# Patient Record
Sex: FEMALE
[De-identification: names, ages, dates, MRNs, and addresses within clinical notes are randomized per-mention and may not be internally consistent; named-entity substitution may affect disease eponyms.]

---

## 2020-07-09 ENCOUNTER — HOSPITAL ENCOUNTER (EMERGENCY)
Dept: HOSPITAL 88 - ER | Age: 50
LOS: 1 days | Discharge: HOME | End: 2020-07-10
Payer: SELF-PAY

## 2020-07-09 VITALS — WEIGHT: 293 LBS | HEIGHT: 62 IN | BODY MASS INDEX: 53.92 KG/M2

## 2020-07-09 DIAGNOSIS — J12.9: ICD-10-CM

## 2020-07-09 DIAGNOSIS — R05: ICD-10-CM

## 2020-07-09 DIAGNOSIS — R06.02: ICD-10-CM

## 2020-07-09 DIAGNOSIS — U07.1: Primary | ICD-10-CM

## 2020-07-09 DIAGNOSIS — R50.9: ICD-10-CM

## 2020-07-09 PROCEDURE — 99283 EMERGENCY DEPT VISIT LOW MDM: CPT

## 2020-07-09 PROCEDURE — 71045 X-RAY EXAM CHEST 1 VIEW: CPT

## 2020-07-10 NOTE — DIAGNOSTIC IMAGING REPORT
EXAMINATION:  CHEST SINGLE (PORTABLE)    



INDICATION: Cough, fever  



COMPARISON:  None

     

FINDINGS:    



TUBES and LINES:  None.



LUNGS:  Normal lung volumes.  Patchy bilateral mid to lower lung haziness.     



PLEURA:  No pleural effusion or pneumothorax.



HEART AND MEDIASTINUM:  The cardiomediastinal silhouette is unremarkable.    



BONES AND SOFT TISSUES:  No acute osseous lesion.  Soft tissues are

unremarkable.



UPPER ABDOMEN: No free air under the diaphragm.    



IMPRESSION: 



Findings compatible with multifocal viral pneumonia.



Signed by: Anibal Heard DO on 7/10/2020 12:38 AM

## 2020-07-10 NOTE — EMERGENCY DEPARTMENT NOTE
History of Present Illnes


History of Present Illness


Chief Complaint:  Respiratory


History of Present Illness


This is a 49 year old  female  C/O SOB AND DIFFICUKLTY TAKING A DEEP


   BREATH WITH N/V, FEVER/CHILLS ONSET X1 WEEK PTA; PT WAS TESTED TODAY AND 

   PENDING


   RESULTS FOR COVID-19; PT FEBRILE IN TRIAGE, TEMP 102.6; SPO2 95% RA .


Historian:  Patient


Arrival Mode:  Car


Onset (how long ago):  day(s) (7)


Location:  CHEST


Quality:  COUGH, HURTS TO BREATH, FEVER, CHILLS


Severity:  moderate


Onset quality:  gradual


Duration (how long):  day(s) (7)


Timing of current episode:  constant


Progression:  unchanged


Context:  Reports recent illness (COUGH, FEVER, CHILLS)


Relieving factors:  none


Exacerbating factors:  none


Associated symptoms:  Reports cough, Reports fever/chills


Treatments prior to arrival:  none





Past Medical/Family History


Physician Review


I have reviewed the patient's past medical and family history.  Any updates have

been documented here.





Past Medical History


Recent Fever:  Yes


Clinical Suspicion of Infectio:  Yes


New/Unexplained Change in Ment:  No


Past Medical History:  None


Past Surgical History:  Hysterectomy





Social History


Smoking Cessation:  Never Smoker


Alcohol Use:  None


Any Illegal Drug Use:  No





Review of Systems


Review of Systems


Constitutional:  Reports as per HPI


EENTM:  Reports no symptoms


Cardiovascular:  Reports no symptoms


Respiratory:  Reports as per HPI


Gastrointestinal:  Reports no symptoms


Genitourinary:  Reports no symptoms


Musculoskeletal:  Reports no symptoms


Integumentary:  Reports no symptoms


Neurological:  Reports no symptoms


Psychological:  Reports no symptoms


Endocrine:  Reports no symptoms


Hematological/Lymphatic:  Reports no symptoms





Physical Exam


Related Data


Allergies:  


Coded Allergies:  


     No Known Allergies (Unverified , 7/9/20)


Triage Vital Signs





Vital Signs








  Date Time  Temp Pulse Resp B/P (MAP) Pulse Ox O2 Delivery O2 Flow Rate FiO2


 


7/9/20 23:39 102.6 87 24 188/88 94 Room Air  








Vital signs reviewed:  Yes





Physical Exam


CONSTITUTIONAL





Constitutional:  Present well-developed, Present well-nourished


HENT


HENT:  Present normocephalic, Present atraumatic, Present oropharynx 

clear/moist, Present nose normal


HENT L/R:  Present left ext ear normal, Present right ext ear normal


EYES





Eyes:  Reports PERRL, Reports conjunctivae normal


NECK


Neck:  Present ROM normal


PULMONARY


Pulmonary:  Present effort normal, Present rhonchi (FAINT AT BASES BILATERAL)


CARDIOVASCULAR





Cardiovascular:  Present regular rhythm, Present heart sounds normal, Present 

capillary refill normal, Present normal rate


GASTROINTESTINAL





Abdominal:  Present soft, Present nontender, Present bowel sounds normal


GENITOURINARY





Genitourinary:  Present exam deferred


SKIN


Skin:  Present warm, Present dry


MUSCULOSKELETAL





Musculoskeletal:  Present ROM normal


NEUROLOGICAL





Neurological:  Present alert, Present oriented x 3, Present no gross motor or 

sensory deficits


PSYCHOLOGICAL


Psychological:  Present mood/affect normal, Present judgement normal





Results


Imaging


Imaging results reviewed:  Yes


Impressions


EXAMINATION:  CHEST SINGLE (PORTABLE)    





INDICATION: Cough, fever  





COMPARISON:  None


     


FINDINGS:    





TUBES and LINES:  None.





LUNGS:  Normal lung volumes.  Patchy bilateral mid to lower lung haziness.     





PLEURA:  No pleural effusion or pneumothorax.





HEART AND MEDIASTINUM:  The cardiomediastinal silhouette is unremarkable.    





BONES AND SOFT TISSUES:  No acute osseous lesion.  Soft tissues are


unremarkable.





UPPER ABDOMEN: No free air under the diaphragm.    





IMPRESSION: 





Findings compatible with multifocal viral pneumonia.





Signed by: Anibal Heard DO on 7/10/2020 12:38 AM





Assessment & Plan


Medical Decision Making


MDM


PT WITH COVID SYMPTOMS


CXR ORDERED TO EVAL FOR PNEUMONIA


TYLENOL 975 MG PO ORDERED





PT WITH PRESUMED COVID 19


DISCHARGED WITH [facility], INSTRUCTED ON 14 DAY SELF QUARANTINE, SLEEPING STOMACH OR 

SIDES BUT NOT ON BACK





Reassessment


Reassessment time:  01:49


Reassessment


TEMP 99.1. PT FEELS BETTER, OXYGEN SATURATION 96% ON ROOM AIR, NO RESPIRATORY 

DISTRESS AT THIS TIME





Assessment & Plan


Final Impression:  


(1) COVID-19


(2) Viral pneumonia


Depart Disposition:  HOME, SELF-CARE


Last Vital Signs











  Date Time  Temp Pulse Resp B/P (MAP) Pulse Ox O2 Delivery O2 Flow Rate FiO2


 


7/9/20 23:39 102.6 87 24 188/88 94 Room Air  








Medications in the ED





Ondansetron HCl 4 mg ONCE  ONCE PO  Last administered on 7/9/20at 23:51; Admin 

Dose 4 MG;  Start 7/9/20 at 23:45;  Stop 7/10/20 at 00:06;  Status DC


Acetaminophen 975 mg ONCE  ONCE PO  Last administered on 7/9/20at 23:51; Admin 

Dose 975 MG;  Start 7/9/20 at 23:45;  Stop 7/10/20 at 00:06;  Status DC











JARON WILKINSON MD        Jul 10, 2020 00:14

## 2020-07-11 ENCOUNTER — HOSPITAL ENCOUNTER (EMERGENCY)
Dept: HOSPITAL 88 - ER | Age: 50
LOS: 1 days | Discharge: ANOTHER HOSPITAL | End: 2020-07-12
Payer: SELF-PAY

## 2020-07-11 VITALS — BODY MASS INDEX: 53.92 KG/M2 | WEIGHT: 293 LBS | HEIGHT: 62 IN

## 2020-07-11 DIAGNOSIS — R50.9: ICD-10-CM

## 2020-07-11 DIAGNOSIS — U07.1: Primary | ICD-10-CM

## 2020-07-11 DIAGNOSIS — R09.02: ICD-10-CM

## 2020-07-11 DIAGNOSIS — R05: ICD-10-CM

## 2020-07-11 DIAGNOSIS — J12.9: ICD-10-CM

## 2020-07-11 PROCEDURE — 82550 ASSAY OF CK (CPK): CPT

## 2020-07-11 PROCEDURE — 82553 CREATINE MB FRACTION: CPT

## 2020-07-11 PROCEDURE — 87040 BLOOD CULTURE FOR BACTERIA: CPT

## 2020-07-11 PROCEDURE — 85025 COMPLETE CBC W/AUTO DIFF WBC: CPT

## 2020-07-11 PROCEDURE — 84484 ASSAY OF TROPONIN QUANT: CPT

## 2020-07-11 PROCEDURE — 80053 COMPREHEN METABOLIC PANEL: CPT

## 2020-07-11 PROCEDURE — 71045 X-RAY EXAM CHEST 1 VIEW: CPT

## 2020-07-11 PROCEDURE — 36415 COLL VENOUS BLD VENIPUNCTURE: CPT

## 2020-07-11 PROCEDURE — 99285 EMERGENCY DEPT VISIT HI MDM: CPT

## 2020-07-11 NOTE — EMERGENCY DEPARTMENT NOTE
History of Present Illnes


History of Present Illness


Chief Complaint:  COVID PUI


History of Present Illness


This is a 49 year old  female  PATIENT COMES BACK TO ED AFTER DISCHARGE 

FROM THIS FACILITY 2 EDAYS FOR COVID SUSPECT


   DIAGNOSIS. COMES THIS EVENING FOR INCREASED SHORTNESS OF BREATH AND FEVER. 

   PER EMS PT WITH OXYGEN SATURATION OF 86% ON ROOM AIR. PLACED ON 6 LITERS O2 

   VIA NC AND OXIYGEN SATURATION NOW 93%.


Historian:  Patient, Paramedic/EMS


Onset (how long ago):  day(s) (14)


Location:  CHEST


Quality:  SOB, COUGH, FEVER


Severity:  moderate


Onset quality:  gradual


Duration (how long):  week(s) (2)


Timing of current episode:  constant


Progression:  worsening


Chronicity:  recurrent


Context:  Reports recent illness (COVID 19)


Relieving factors:  none


Exacerbating factors:  movement


Associated symptoms:  Reports cough, Reports fever/chills, Reports malaise, 

Reports shortness of breath





Past Medical/Family History


Physician Review


I have reviewed the patient's past medical and family history.  Any updates have

been documented here.





Past Medical History


Recent Fever:  Yes


Clinical Suspicion of Infectio:  Yes


New/Unexplained Change in Ment:  No


Past Medical History:  None


Past Surgical History:  Hysterectomy





Social History


Alcohol Use:  None


Any Illegal Drug Use:  No





Family History


Family history of heart diseas:  No





Review of Systems


Review of Systems


Constitutional:  Reports as per HPI


EENTM:  Reports no symptoms


Cardiovascular:  Reports no symptoms


Respiratory:  Reports as per HPI


Gastrointestinal:  Reports no symptoms


Genitourinary:  Reports no symptoms


Musculoskeletal:  Reports no symptoms


Integumentary:  Reports no symptoms


Neurological:  Reports no symptoms


Psychological:  Reports no symptoms


Endocrine:  Reports no symptoms


Hematological/Lymphatic:  Reports no symptoms





Physical Exam


Related Data


Allergies:  


Coded Allergies:  


     No Known Allergies (Unverified , 7/9/20)


Triage Vital Signs





Vital Signs








  Date Time  Temp Pulse Resp B/P (MAP) Pulse Ox O2 Delivery O2 Flow Rate FiO2


 


7/11/20 23:33 102.7 103 33 167/91 94 Nasal Cannula 4.0 








Vital signs reviewed:  Yes





Physical Exam


CONSTITUTIONAL





Constitutional:  Present well-developed, Present well-nourished, Present 

distressed (MILD)


HENT


HENT:  Present normocephalic, Present atraumatic, Present oropharynx 

clear/moist, Present nose normal


HENT L/R:  Present left ext ear normal, Present right ext ear normal


EYES





Eyes:  Reports PERRL, Reports conjunctivae normal


NECK


Neck:  Present ROM normal


PULMONARY


Pulmonary:  Present effort normal, Present respiratory distress (MILD 

TACYPHNEA), Present rhonchi (THROUGHOUT)


CARDIOVASCULAR





Cardiovascular:  Present regular rhythm, Present heart sounds normal, Present 

capillary refill normal, Present normal rate


GASTROINTESTINAL





Abdominal:  Present soft, Present nontender, Present bowel sounds normal


GENITOURINARY





Genitourinary:  Present exam deferred


SKIN


Skin:  Present warm, Present dry


MUSCULOSKELETAL





Musculoskeletal:  Present ROM normal


NEUROLOGICAL





Neurological:  Present alert, Present oriented x 3, Present no gross motor or 

sensory deficits


PSYCHOLOGICAL


Psychological:  Present mood/affect normal, Present judgement normal





Results


Laboratory


Laboratory





Laboratory Tests








Test


 7/11/20


23:58


 


White Blood Count


 15.19 x10e3/uL


(4.8-10.8)


 


Red Blood Count


 5.02 x10e6/uL


(3.6-5.1)


 


Hemoglobin


 12.7 g/dL


(12.0-16.0)


 


Hematocrit


 40.1 %


(34.2-44.1)


 


Mean Corpuscular Volume


 79.9 fL


(81-99)


 


Mean Corpuscular Hemoglobin


 25.3 pg


(28-32)


 


Mean Corpuscular Hemoglobin


Concent 31.7 g/dL


(31-35)


 


Red Cell Distribution Width


 14.0 %


(11.7-14.4)


 


Platelet Count


 248 x10e3/uL


(140-360)


 


Neutrophils (%) (Auto)


 84.5 %


(38.7-80.0)


 


Lymphocytes (%) (Auto)


 10.3 %


(18.0-39.1)


 


Monocytes (%) (Auto)


 4.1  %


(4.4-11.3)


 


Eosinophils (%) (Auto)


 0.0 %


(0.0-6.0)


 


Basophils (%) (Auto)


 0.1 %


(0.0-1.0)


 


Neutrophils # (Auto) 12.8 (2.1-6.9) 


 


Lymphocytes # (Auto) 1.6 (1.0-3.2) 


 


Monocytes # (Auto) 0.6 (0.2-0.8) 


 


Eosinophils # (Auto) 0.0 (0.0-0.4) 


 


Basophils # (Auto) 0.0 (0.0-0.1) 


 


Absolute Immature Granulocyte


(auto 0.15 x10e3/uL


(0-0.1)


 


Sodium Level


 137 mmol/L


(136-145)


 


Potassium Level


 3.8 mmol/L


(3.5-5.1)


 


Chloride Level


 103 mmol/L


()


 


Carbon Dioxide Level


 25 mmol/L


(22-29)


 


Anion Gap


 12.8 mmol/L


(8-16)


 


Blood Urea Nitrogen


 13 mg/dL


(7-26)


 


Creatinine


 0.71 mg/dL


(0.57-1.11)


 


Estimat Glomerular Filtration


Rate > 60 ML/MIN


(60-)


 


BUN/Creatinine Ratio 18 (6-25) 


 


Glucose Level


 126 mg/dL


()


 


Calcium Level


 8.6 mg/dL


(8.4-10.2)


 


Total Bilirubin


 0.9 mg/dL


(0.2-1.2)


 


Aspartate Amino Transf


(AST/SGOT) 38 IU/L (5-34) 





 


Alanine Aminotransferase


(ALT/SGPT) 77 IU/L (0-55) 





 


Alkaline Phosphatase


 109 IU/L


()


 


Creatine Kinase


 42 IU/L


()


 


Creatine Kinase MB


 0.30 ng/mL


(0-5.0)


 


Troponin I


 0.008 ng/mL


(0-0.300)


 


Total Protein


 7.3 g/dL


(6.5-8.1)


 


Albumin


 2.8 g/dL


(3.5-5.0)


 


Globulin


 4.5 g/dL


(2.3-3.5)


 


Albumin/Globulin Ratio 0.6 (0.8-2.0) 








Lab results reviewed:  Yes





Imaging


Imaging results reviewed:  Yes


Impressions


Procedure: [id number] DX/CHEST SINGLE (PORTABLE)


Exam Date: 07/12/20                            Exam Time: 0115








                              REPORT STATUS: Signed





EXAMINATION:  CHEST SINGLE (PORTABLE)    





INDICATION:      Short of breath, fever, covid +


 


COMPARISON:  Chest x-ray 7/10/2020


     


FINDINGS:    





TUBES and LINES:  None.





LUNGS:  Normal lung volumes.  Multifocal patchy airspace opacities, worse


compared to 7/10/2020.     





PLEURA:  No pleural effusion or pneumothorax.





HEART AND MEDIASTINUM:  The cardiomediastinal silhouette is unremarkable.    





BONES AND SOFT TISSUES:  No acute osseous lesion.  Soft tissues are


unremarkable.





UPPER ABDOMEN: No free air under the diaphragm.    





IMPRESSION: 





Worsening severity of multifocal pneumonia.





Signed by: Reva Heard DO on 7/12/2020 1:45 AM








Dictated By: REVA HEARD DO


Electronically Signed By: REVA HEARD DO on 07/12/20 0145


Transcribed By: DOMINIQUE on 07/12/20 0145





Procedures


12 Lead ECG Interpretation


ECG Interpretation :  


   ECG:  ECG 1


   :  Interpreted by ED physician


   Date:  Jul 11, 2020


   Time:  23:55


   Rhythm:  sinus rhythm


   Rate:  normal


   BPM:  90


   QRS axis:  normal


   ST segments normal:  Yes


   T waves normal:  Yes


   Clinical Impression:  non-specific ECG


   Additional Comments


MOTION ARTIFACT PRESENT





Assessment & Plan


Medical Decision Making


MDM


PT WITH COVID 19 AND NOW WITH HYPOXIA


PT PLACED ON 02 6LPM VIA NC





CBC, CMP, EKG, CXR, BLOOD CULTURES ORDERED


ROCEPHIN 1 GRAM IV ORDERED


ZITHROMAX 500 MG IV ORDERED


DEXAMETHASONE 6 MG IV ORDERED





0700 CARE TRANSFERRED TO DR STANLEY PENDING COVID 19 RESULT, ADMISSION TO A COVID 

PUI BED WHICH IS NOT AVAILABLE AT THIS FACILITY AT THIS TIME  OR TRANSFER TO 

ANOTHER FACILITY WITH A COVID PUI BED





Assessment & Plan


Final Impression:  


(1) Hypoxia


(2) Fever


(3) Viral pneumonia


(4) COVID-19


Last Vital Signs











  Date Time  Temp Pulse Resp B/P (MAP) Pulse Ox O2 Delivery O2 Flow Rate FiO2


 


7/11/20 23:33 102.7 103 33 167/91 94 Nasal Cannula 4.0 








Medications in the ED





Acetaminophen 975 mg ONCE  ONCE PO ;  Start 7/11/20 at 23:45;  Stop 7/11/20 at 

23:46


Ceftriaxone Sodium 50 ml @  100 mls/hr ONCE  ONCE IV ;  Start 7/11/20 at 23:45; 

Stop 7/12/20 at 00:14


Azithromycin 250 ml @  200 mls/hr NOW  ONCE IV ;  Start 7/11/20 at 23:45;  Stop 

7/12/20 at 00:59


Dexamethasone Sodium Phosphate 6 mg ONCE  ONCE IV ;  Start 7/11/20 at 23:45;  

Stop 7/11/20 at 23:46











JARON WILKINSON MD        Jul 11, 2020 23:47

## 2020-07-12 LAB
ALBUMIN SERPL-MCNC: 2.8 G/DL (ref 3.5–5)
ALBUMIN/GLOB SERPL: 0.6 {RATIO} (ref 0.8–2)
ALP SERPL-CCNC: 109 IU/L (ref 40–150)
ALT SERPL-CCNC: 77 IU/L (ref 0–55)
ANION GAP SERPL CALC-SCNC: 12.8 MMOL/L (ref 8–16)
BASOPHILS # BLD AUTO: 0 10*3/UL (ref 0–0.1)
BASOPHILS NFR BLD AUTO: 0.1 % (ref 0–1)
BUN SERPL-MCNC: 13 MG/DL (ref 7–26)
BUN/CREAT SERPL: 18 (ref 6–25)
CALCIUM SERPL-MCNC: 8.6 MG/DL (ref 8.4–10.2)
CHLORIDE SERPL-SCNC: 103 MMOL/L (ref 98–107)
CK MB SERPL-MCNC: 0.3 NG/ML (ref 0–5)
CK SERPL-CCNC: 42 IU/L (ref 29–168)
CO2 SERPL-SCNC: 25 MMOL/L (ref 22–29)
DEPRECATED NEUTROPHILS # BLD AUTO: 12.8 10*3/UL (ref 2.1–6.9)
EGFRCR SERPLBLD CKD-EPI 2021: > 60 ML/MIN (ref 60–?)
EOSINOPHIL # BLD AUTO: 0 10*3/UL (ref 0–0.4)
EOSINOPHIL NFR BLD AUTO: 0 % (ref 0–6)
ERYTHROCYTE [DISTWIDTH] IN CORD BLOOD: 14 % (ref 11.7–14.4)
GLOBULIN PLAS-MCNC: 4.5 G/DL (ref 2.3–3.5)
GLUCOSE SERPLBLD-MCNC: 126 MG/DL (ref 74–118)
HCT VFR BLD AUTO: 40.1 % (ref 34.2–44.1)
HGB BLD-MCNC: 12.7 G/DL (ref 12–16)
LYMPHOCYTES # BLD: 1.6 10*3/UL (ref 1–3.2)
LYMPHOCYTES NFR BLD AUTO: 10.3 % (ref 18–39.1)
MCH RBC QN AUTO: 25.3 PG (ref 28–32)
MCHC RBC AUTO-ENTMCNC: 31.7 G/DL (ref 31–35)
MCV RBC AUTO: 79.9 FL (ref 81–99)
MONOCYTES # BLD AUTO: 0.6 10*3/UL (ref 0.2–0.8)
MONOCYTES NFR BLD AUTO: 4.1 % (ref 4.4–11.3)
NEUTS SEG NFR BLD AUTO: 84.5 % (ref 38.7–80)
PLATELET # BLD AUTO: 248 X10E3/UL (ref 140–360)
POTASSIUM SERPL-SCNC: 3.8 MMOL/L (ref 3.5–5.1)
RBC # BLD AUTO: 5.02 X10E6/UL (ref 3.6–5.1)
SODIUM SERPL-SCNC: 137 MMOL/L (ref 136–145)

## 2020-07-12 NOTE — DIAGNOSTIC IMAGING REPORT
EXAMINATION:  CHEST SINGLE (PORTABLE)    



INDICATION:      Short of breath, fever, covid +

 

COMPARISON:  Chest x-ray 7/10/2020

     

FINDINGS:    



TUBES and LINES:  None.



LUNGS:  Normal lung volumes.  Multifocal patchy airspace opacities, worse

compared to 7/10/2020.     



PLEURA:  No pleural effusion or pneumothorax.



HEART AND MEDIASTINUM:  The cardiomediastinal silhouette is unremarkable.    



BONES AND SOFT TISSUES:  No acute osseous lesion.  Soft tissues are

unremarkable.



UPPER ABDOMEN: No free air under the diaphragm.    



IMPRESSION: 



Worsening severity of multifocal pneumonia.



Signed by: Anibal Heard DO on 7/12/2020 1:45 AM

## 2020-07-12 NOTE — NUR
MD AND NURSE SPOKE WITH PT ABOUT TRANSFER TO ANOTHER FACILITY INFORMED PT BED 
WAS AVAILABLE IN [place], [place] TRIED TO FIND HOSP IN [facility] AREA WITH 
NO SUCCESS PT STATES SHE IS OK WITH GOING TO [place], [place] MD EXPLAINED THE 
RISKS/BENEFITS OF BEING TRANSPORTED AND NEED FOR CONTINUING CARE DUE TO PT 
NEEDING SUPPLEMENTAL O2 TO MAINTAIN O2 SAT PT STATES SHE UNDERSTANDS AND IS 
WILLING TO DO WHATEVER IS NECESSARY TO GET BETTER AND IS ABLE TO GET A RIDE 
HOME FROM [place], [place] WHEN SHE IS D/C'D FROM THERE PT GIVEN OPPORTUNITY TO 
ASK MD QUESTIONS STATES SHE HAS NO QUESTIONS AT THIS TIME

## 2020-07-12 NOTE — NUR
RECEIVED REPORT FROM OFF GOING NURSE. PATIENT IN ROOM IN BED. NO S/S OF ACUTE 
DISTRESS. PENDING TRANSFER.

## 2025-06-17 ENCOUNTER — HOSPITAL ENCOUNTER (EMERGENCY)
Dept: HOSPITAL 88 - FSED | Age: 55
Discharge: HOME | End: 2025-06-17
Payer: COMMERCIAL

## 2025-06-17 VITALS — WEIGHT: 234.12 LBS | HEIGHT: 62 IN | BODY MASS INDEX: 43.08 KG/M2

## 2025-06-17 VITALS — DIASTOLIC BLOOD PRESSURE: 81 MMHG | SYSTOLIC BLOOD PRESSURE: 192 MMHG

## 2025-06-17 VITALS — TEMPERATURE: 98.1 F | HEART RATE: 60 BPM | OXYGEN SATURATION: 96 %

## 2025-06-17 DIAGNOSIS — I10: ICD-10-CM

## 2025-06-17 DIAGNOSIS — R07.89: ICD-10-CM

## 2025-06-17 DIAGNOSIS — B02.9: Primary | ICD-10-CM

## 2025-06-17 PROCEDURE — 71046 X-RAY EXAM CHEST 2 VIEWS: CPT

## 2025-06-17 PROCEDURE — 81003 URINALYSIS AUTO W/O SCOPE: CPT

## 2025-06-17 PROCEDURE — 84484 ASSAY OF TROPONIN QUANT: CPT

## 2025-06-17 PROCEDURE — 99283 EMERGENCY DEPT VISIT LOW MDM: CPT

## 2025-06-17 PROCEDURE — 85025 COMPLETE CBC W/AUTO DIFF WBC: CPT

## 2025-06-17 PROCEDURE — 85379 FIBRIN DEGRADATION QUANT: CPT

## 2025-06-17 PROCEDURE — 80053 COMPREHEN METABOLIC PANEL: CPT

## 2025-06-17 PROCEDURE — 93005 ELECTROCARDIOGRAM TRACING: CPT

## 2025-06-17 RX ADMIN — Medication ONE MG: at 12:40

## 2025-06-17 RX ADMIN — HYDRALAZINE HYDROCHLORIDE ONE MG: 20 INJECTION INTRAMUSCULAR; INTRAVENOUS at 13:38

## 2025-06-17 RX ADMIN — AMLODIPINE BESYLATE ONE MG: 5 TABLET ORAL at 12:39
